# Patient Record
Sex: FEMALE | Race: WHITE
[De-identification: names, ages, dates, MRNs, and addresses within clinical notes are randomized per-mention and may not be internally consistent; named-entity substitution may affect disease eponyms.]

---

## 2019-03-30 ENCOUNTER — HOSPITAL ENCOUNTER (EMERGENCY)
Dept: HOSPITAL 82 - ED | Age: 34
LOS: 1 days | Discharge: HOME | DRG: 914 | End: 2019-03-31
Payer: COMMERCIAL

## 2019-03-30 VITALS — HEIGHT: 66 IN | WEIGHT: 293 LBS | BODY MASS INDEX: 47.09 KG/M2

## 2019-03-30 DIAGNOSIS — S09.90XA: Primary | ICD-10-CM

## 2019-03-30 DIAGNOSIS — S40.211A: ICD-10-CM

## 2019-03-30 DIAGNOSIS — S20.212A: ICD-10-CM

## 2019-03-30 DIAGNOSIS — S40.012A: ICD-10-CM

## 2019-03-30 DIAGNOSIS — S00.03XA: ICD-10-CM

## 2019-03-30 DIAGNOSIS — S40.811A: ICD-10-CM

## 2019-03-30 DIAGNOSIS — S20.211A: ICD-10-CM
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
## 2019-03-30 DIAGNOSIS — S60.211A: ICD-10-CM
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
## 2019-03-30 DIAGNOSIS — S60.222A: ICD-10-CM
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
## 2019-03-30 DIAGNOSIS — S30.811A: ICD-10-CM

## 2019-03-30 DIAGNOSIS — S00.01XA: ICD-10-CM

## 2019-03-30 DIAGNOSIS — S70.02XA: ICD-10-CM

## 2019-03-30 DIAGNOSIS — S40.022A: ICD-10-CM

## 2019-03-30 DIAGNOSIS — R42: ICD-10-CM

## 2019-03-30 LAB
ALBUMIN SERPL-MCNC: 3.8 G/DL (ref 3.2–5)
ALP SERPL-CCNC: 92 U/L (ref 38–126)
ANION GAP SERPL CALCULATED.3IONS-SCNC: 14 MMOL/L
AST SERPL-CCNC: 20 U/L (ref 14–36)
BASOPHILS NFR BLD AUTO: 0 % (ref 0–3)
BUN SERPL-MCNC: 20 MG/DL (ref 7–17)
BUN/CREAT SERPL: 30
CHLORIDE SERPL-SCNC: 104 MMOL/L (ref 95–108)
CO2 SERPL-SCNC: 25 MMOL/L (ref 22–30)
CREAT SERPL-MCNC: 0.7 MG/DL (ref 0.5–1)
EOSINOPHIL NFR BLD AUTO: 2 % (ref 0–8)
ERYTHROCYTE [DISTWIDTH] IN BLOOD BY AUTOMATED COUNT: 16.7 % (ref 11.5–15.5)
HCT VFR BLD AUTO: 38.4 % (ref 37–47)
HGB BLD-MCNC: 11.7 G/DL (ref 12–16)
IMM GRANULOCYTES NFR BLD: 0.8 % (ref 0–5)
LYMPHOCYTES NFR BLD: 15 % (ref 15–41)
MCH RBC QN AUTO: 24 PG  CALC (ref 26–32)
MCHC RBC AUTO-ENTMCNC: 30.5 G/L CALC (ref 32–36)
MCV RBC AUTO: 78.9 FL  CALC (ref 80–100)
MONOCYTES NFR BLD AUTO: 5 % (ref 2–13)
NEUTROPHILS # BLD AUTO: 16.89 THOU/UL (ref 2–7.15)
NEUTROPHILS NFR BLD AUTO: 78 % (ref 42–76)
PLATELET # BLD AUTO: 401 THOU/UL (ref 130–400)
POTASSIUM SERPL-SCNC: 4.3 MMOL/L (ref 3.5–5.1)
PROT SERPL-MCNC: 6.8 G/DL (ref 6.3–8.2)
RBC # BLD AUTO: 4.87 MILL/UL (ref 4.2–5.6)
SODIUM SERPL-SCNC: 139 MMOL/L (ref 137–146)

## 2019-03-31 VITALS — SYSTOLIC BLOOD PRESSURE: 128 MMHG | DIASTOLIC BLOOD PRESSURE: 62 MMHG

## 2019-07-16 ENCOUNTER — APPOINTMENT (RX ONLY)
Dept: URBAN - METROPOLITAN AREA CLINIC 81 | Facility: CLINIC | Age: 34
Setting detail: DERMATOLOGY
End: 2019-07-16

## 2019-07-16 VITALS — WEIGHT: 293 LBS

## 2019-07-16 DIAGNOSIS — L259 CONTACT DERMATITIS AND OTHER ECZEMA, UNSPECIFIED CAUSE: ICD-10-CM

## 2019-07-16 DIAGNOSIS — B86 SCABIES: ICD-10-CM

## 2019-07-16 PROBLEM — L30.9 DERMATITIS, UNSPECIFIED: Status: ACTIVE | Noted: 2019-07-16

## 2019-07-16 PROBLEM — L30.8 OTHER SPECIFIED DERMATITIS: Status: ACTIVE | Noted: 2019-07-16

## 2019-07-16 PROCEDURE — ? COUNSELING: TOPICAL STEROIDS

## 2019-07-16 PROCEDURE — ? COUNSELING

## 2019-07-16 PROCEDURE — ? TREATMENT REGIMEN

## 2019-07-16 PROCEDURE — ? PRESCRIPTION

## 2019-07-16 PROCEDURE — 99202 OFFICE O/P NEW SF 15 MIN: CPT

## 2019-07-16 RX ORDER — IVERMECTIN 3 MG/1
TABLET ORAL
Qty: 9 | Refills: 1 | Status: ERX | COMMUNITY
Start: 2019-07-16

## 2019-07-16 RX ORDER — TRIAMCINOLONE ACETONIDE 1 MG/G
CREAM TOPICAL BID
Qty: 1 | Refills: 1 | Status: ERX | COMMUNITY
Start: 2019-07-16

## 2019-07-16 RX ORDER — PERMETHRIN 50 MG/G
CREAM TOPICAL
Qty: 1 | Refills: 1 | Status: ERX | COMMUNITY
Start: 2019-07-16

## 2019-07-16 RX ADMIN — IVERMECTIN: 3 TABLET ORAL at 18:30

## 2019-07-16 RX ADMIN — PERMETHRIN: 50 CREAM TOPICAL at 18:28

## 2019-07-16 RX ADMIN — TRIAMCINOLONE ACETONIDE: 1 CREAM TOPICAL at 18:30

## 2019-07-16 ASSESSMENT — LOCATION DETAILED DESCRIPTION DERM
LOCATION DETAILED: RIGHT KNEE
LOCATION DETAILED: LEFT PROXIMAL PRETIBIAL REGION
LOCATION DETAILED: LEFT VENTRAL DISTAL FOREARM
LOCATION DETAILED: RIGHT VENTRAL PROXIMAL FOREARM
LOCATION DETAILED: RIGHT PROXIMAL DORSAL FOREARM
LOCATION DETAILED: RIGHT INFERIOR LATERAL LOWER BACK
LOCATION DETAILED: RIGHT PROXIMAL PRETIBIAL REGION
LOCATION DETAILED: LEFT PROXIMAL DORSAL FOREARM
LOCATION DETAILED: LEFT INFERIOR LATERAL LOWER BACK

## 2019-07-16 ASSESSMENT — LOCATION SIMPLE DESCRIPTION DERM
LOCATION SIMPLE: RIGHT FOREARM
LOCATION SIMPLE: LEFT FOREARM
LOCATION SIMPLE: LEFT PRETIBIAL REGION
LOCATION SIMPLE: RIGHT PRETIBIAL REGION
LOCATION SIMPLE: LEFT LOWER BACK
LOCATION SIMPLE: RIGHT KNEE
LOCATION SIMPLE: RIGHT LOWER BACK

## 2019-07-16 ASSESSMENT — LOCATION ZONE DERM
LOCATION ZONE: ARM
LOCATION ZONE: TRUNK
LOCATION ZONE: LEG

## 2019-07-16 NOTE — PROCEDURE: MIPS QUALITY
Quality 402: Tobacco Use And Help With Quitting Among Adolescents: Tobacco Screening OR Tobacco Cessation Intervention not Performed Reason Not Otherwise Specified
Quality 130: Documentation Of Current Medications In The Medical Record: Current Medications Documented
Detail Level: Detailed
Quality 226: Preventive Care And Screening: Tobacco Use: Screening And Cessation Intervention: Patient screened for tobacco use, is a smoker AND did not received Cessation Counseling for Unknown Reasons
Quality 431: Preventive Care And Screening: Unhealthy Alcohol Use - Screening: Patient screened for unhealthy alcohol use using a single question and scores 2 or greater episodes per year and brief intervention occurred